# Patient Record
Sex: FEMALE | Race: WHITE | HISPANIC OR LATINO | Employment: FULL TIME | URBAN - METROPOLITAN AREA
[De-identification: names, ages, dates, MRNs, and addresses within clinical notes are randomized per-mention and may not be internally consistent; named-entity substitution may affect disease eponyms.]

---

## 2024-09-18 ENCOUNTER — OFFICE VISIT (OUTPATIENT)
Dept: PSYCHIATRY | Facility: CLINIC | Age: 59
End: 2024-09-18
Payer: COMMERCIAL

## 2024-09-18 VITALS
SYSTOLIC BLOOD PRESSURE: 110 MMHG | DIASTOLIC BLOOD PRESSURE: 75 MMHG | HEART RATE: 70 BPM | WEIGHT: 212.31 LBS | BODY MASS INDEX: 31.44 KG/M2 | HEIGHT: 69 IN

## 2024-09-18 DIAGNOSIS — R41.840 CONCENTRATION DEFICIT: ICD-10-CM

## 2024-09-18 DIAGNOSIS — F90.0 ADHD (ATTENTION DEFICIT HYPERACTIVITY DISORDER), INATTENTIVE TYPE: ICD-10-CM

## 2024-09-18 DIAGNOSIS — F33.0 MILD EPISODE OF RECURRENT MAJOR DEPRESSIVE DISORDER: Primary | ICD-10-CM

## 2024-09-18 PROCEDURE — 1159F MED LIST DOCD IN RCRD: CPT | Mod: CPTII,S$GLB,, | Performed by: PSYCHOLOGIST

## 2024-09-18 PROCEDURE — 90792 PSYCH DIAG EVAL W/MED SRVCS: CPT | Mod: S$GLB,,, | Performed by: PSYCHOLOGIST

## 2024-09-18 PROCEDURE — 3074F SYST BP LT 130 MM HG: CPT | Mod: CPTII,S$GLB,, | Performed by: PSYCHOLOGIST

## 2024-09-18 PROCEDURE — 99999 PR PBB SHADOW E&M-EST. PATIENT-LVL III: CPT | Mod: PBBFAC,,, | Performed by: PSYCHOLOGIST

## 2024-09-18 PROCEDURE — 3078F DIAST BP <80 MM HG: CPT | Mod: CPTII,S$GLB,, | Performed by: PSYCHOLOGIST

## 2024-09-18 RX ORDER — DEXTROAMPHETAMINE SACCHARATE, AMPHETAMINE ASPARTATE, DEXTROAMPHETAMINE SULFATE, AND AMPHETAMINE SULFATE 2.5; 2.5; 2.5; 2.5 MG/1; MG/1; MG/1; MG/1
1 TABLET ORAL DAILY
Qty: 30 TABLET | Refills: 0 | Status: SHIPPED | OUTPATIENT
Start: 2024-09-18

## 2024-09-18 RX ORDER — ESCITALOPRAM OXALATE 10 MG/1
10 TABLET ORAL DAILY
Qty: 30 TABLET | Refills: 1 | Status: SHIPPED | OUTPATIENT
Start: 2024-09-18 | End: 2025-09-18
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
## 2024-09-18 NOTE — LETTER
September 18, 2024        Tamiko Finley MD  [id number] [facility] [id number]             [place] - Psychiatry  [address]  [facility] [address]  Phone: [phone]   Patient: Elvira Lipscomb   MR Number: [id number]   YOB: 1965   Date of Visit: 9/18/2024       Dear Dr. Finley:    Thank you for referring Elvira Lipscomb to me for evaluation. Below are the relevant portions of my assessment and plan of care.    Pt is to stop Prozac and start Lexapro 10mg once daily. Pt will continue Adderall 10mg Q AM and Xanax 0.25mg Q D PRN.    If you have questions, please do not hesitate to call me. I look forward to following Elvira along with you.    Sincerely,      Teo Infante, PhD, MP           CC    No Recipients

## 2024-09-19 ENCOUNTER — TELEPHONE (OUTPATIENT)
Dept: PSYCHIATRY | Facility: CLINIC | Age: 59
End: 2024-09-19
Payer: COMMERCIAL

## 2024-09-19 ENCOUNTER — OFFICE VISIT (OUTPATIENT)
Dept: OTOLARYNGOLOGY | Facility: CLINIC | Age: 59
End: 2024-09-19
Payer: COMMERCIAL

## 2024-09-19 ENCOUNTER — TELEPHONE (OUTPATIENT)
Dept: OTOLARYNGOLOGY | Facility: CLINIC | Age: 59
End: 2024-09-19
Payer: COMMERCIAL

## 2024-09-19 VITALS — HEIGHT: 69 IN | BODY MASS INDEX: 30.98 KG/M2 | WEIGHT: 209.19 LBS

## 2024-09-19 DIAGNOSIS — S02.2XXA CLOSED FRACTURE OF NASAL BONE, INITIAL ENCOUNTER: Primary | ICD-10-CM

## 2024-09-19 DIAGNOSIS — S02.2XXA CLOSED FRACTURE OF NASAL SEPTUM, INITIAL ENCOUNTER: ICD-10-CM

## 2024-09-19 PROCEDURE — 3008F BODY MASS INDEX DOCD: CPT | Mod: CPTII,S$GLB,, | Performed by: STUDENT IN AN ORGANIZED HEALTH CARE EDUCATION/TRAINING PROGRAM

## 2024-09-19 PROCEDURE — 1159F MED LIST DOCD IN RCRD: CPT | Mod: CPTII,S$GLB,, | Performed by: STUDENT IN AN ORGANIZED HEALTH CARE EDUCATION/TRAINING PROGRAM

## 2024-09-19 PROCEDURE — 99204 OFFICE O/P NEW MOD 45 MIN: CPT | Mod: S$GLB,,, | Performed by: STUDENT IN AN ORGANIZED HEALTH CARE EDUCATION/TRAINING PROGRAM

## 2024-09-19 PROCEDURE — 99999 PR PBB SHADOW E&M-EST. PATIENT-LVL III: CPT | Mod: PBBFAC,,, | Performed by: STUDENT IN AN ORGANIZED HEALTH CARE EDUCATION/TRAINING PROGRAM

## 2024-09-19 RX ORDER — CYCLOBENZAPRINE HCL 10 MG
10 TABLET ORAL 3 TIMES DAILY PRN
Qty: 30 TABLET | Refills: 0 | Status: SHIPPED | OUTPATIENT
Start: 2024-09-19 | End: 2024-09-29
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
## 2024-09-19 NOTE — TELEPHONE ENCOUNTER
----- Message from Carolina De Leon sent at 9/19/2024  8:27 AM CDT -----  Contact: Cesar/ Spouse  Type:  Sooner Apoointment Request    Caller is requesting a sooner appointment.  Caller declined first available appointment listed below.  Caller will not accept being placed on the waitlist and is requesting a message be sent to doctor.  Name of Caller:Cesar/ Spouse  When is the first available appointment?Nov 1  Symptoms:broken Nose  Would the patient rather a call back or a response via [employer]? Call back  Best Call Back Number:  Additional Information: Cesar is calling to speak to the nurse regarding a sooner appointment     Thanks  RAHEEM

## 2024-09-19 NOTE — TELEPHONE ENCOUNTER
S/w  and he states that pt fell and broke her nose yesterday.  requesting an appt with Dr. Black to discuss surgery. Appt made for today and informed  to bring disc of CT imaging, he verbalized understanding.
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
## 2024-09-20 ENCOUNTER — ANESTHESIA EVENT (OUTPATIENT)
Dept: SURGERY | Facility: HOSPITAL | Age: 59
End: 2024-09-20
Payer: COMMERCIAL

## 2024-09-23 ENCOUNTER — ANESTHESIA (OUTPATIENT)
Dept: SURGERY | Facility: HOSPITAL | Age: 59
End: 2024-09-23
Payer: COMMERCIAL

## 2024-09-23 ENCOUNTER — HOSPITAL ENCOUNTER (OUTPATIENT)
Facility: HOSPITAL | Age: 59
Discharge: HOME | End: 2024-09-23
Attending: STUDENT IN AN ORGANIZED HEALTH CARE EDUCATION/TRAINING PROGRAM | Admitting: STUDENT IN AN ORGANIZED HEALTH CARE EDUCATION/TRAINING PROGRAM
Payer: COMMERCIAL

## 2024-09-23 VITALS
HEART RATE: 61 BPM | HEIGHT: 69 IN | WEIGHT: 209 LBS | OXYGEN SATURATION: 99 % | RESPIRATION RATE: 18 BRPM | BODY MASS INDEX: 30.96 KG/M2 | TEMPERATURE: 98 F | DIASTOLIC BLOOD PRESSURE: 62 MMHG | SYSTOLIC BLOOD PRESSURE: 114 MMHG

## 2024-09-23 DIAGNOSIS — S02.2XXA NASAL BONE FRACTURE: ICD-10-CM

## 2024-09-23 DIAGNOSIS — J34.89 ACQUIRED NASAL SEPTAL DEFECT: ICD-10-CM

## 2024-09-23 DIAGNOSIS — S02.2XXA CLOSED FRACTURE OF NASAL BONE, INITIAL ENCOUNTER: Primary | ICD-10-CM

## 2024-09-23 PROCEDURE — 71000015 HC POSTOP RECOV 1ST HR: Mod: PO | Performed by: STUDENT IN AN ORGANIZED HEALTH CARE EDUCATION/TRAINING PROGRAM

## 2024-09-23 PROCEDURE — 21337 CLOSED TX SEPTAL&NOSE FX: CPT | Mod: ,,, | Performed by: STUDENT IN AN ORGANIZED HEALTH CARE EDUCATION/TRAINING PROGRAM

## 2024-09-23 PROCEDURE — 71000033 HC RECOVERY, INTIAL HOUR: Mod: PO | Performed by: STUDENT IN AN ORGANIZED HEALTH CARE EDUCATION/TRAINING PROGRAM

## 2024-09-23 PROCEDURE — 37000009 HC ANESTHESIA EA ADD 15 MINS: Mod: PO | Performed by: STUDENT IN AN ORGANIZED HEALTH CARE EDUCATION/TRAINING PROGRAM

## 2024-09-23 PROCEDURE — 25000003 PHARM REV CODE 250: Mod: PO | Performed by: NURSE ANESTHETIST, CERTIFIED REGISTERED

## 2024-09-23 PROCEDURE — 37000008 HC ANESTHESIA 1ST 15 MINUTES: Mod: PO | Performed by: STUDENT IN AN ORGANIZED HEALTH CARE EDUCATION/TRAINING PROGRAM

## 2024-09-23 PROCEDURE — 25000003 PHARM REV CODE 250: Mod: PO | Performed by: STUDENT IN AN ORGANIZED HEALTH CARE EDUCATION/TRAINING PROGRAM

## 2024-09-23 PROCEDURE — 36000704 HC OR TIME LEV I 1ST 15 MIN: Mod: PO | Performed by: STUDENT IN AN ORGANIZED HEALTH CARE EDUCATION/TRAINING PROGRAM

## 2024-09-23 PROCEDURE — 63600175 PHARM REV CODE 636 W HCPCS: Mod: PO | Performed by: ANESTHESIOLOGY

## 2024-09-23 PROCEDURE — 63600175 PHARM REV CODE 636 W HCPCS: Mod: PO | Performed by: NURSE ANESTHETIST, CERTIFIED REGISTERED

## 2024-09-23 PROCEDURE — 36000705 HC OR TIME LEV I EA ADD 15 MIN: Mod: PO | Performed by: STUDENT IN AN ORGANIZED HEALTH CARE EDUCATION/TRAINING PROGRAM

## 2024-09-23 PROCEDURE — 21320 CLSD TX NSL FX W/MNPJ&STABLJ: CPT | Mod: 59,,, | Performed by: STUDENT IN AN ORGANIZED HEALTH CARE EDUCATION/TRAINING PROGRAM

## 2024-09-23 PROCEDURE — 71000039 HC RECOVERY, EACH ADD'L HOUR: Mod: PO | Performed by: STUDENT IN AN ORGANIZED HEALTH CARE EDUCATION/TRAINING PROGRAM

## 2024-09-23 RX ORDER — SODIUM CHLORIDE, SODIUM LACTATE, POTASSIUM CHLORIDE, CALCIUM CHLORIDE 600; 310; 30; 20 MG/100ML; MG/100ML; MG/100ML; MG/100ML
INJECTION, SOLUTION INTRAVENOUS CONTINUOUS
Status: DISCONTINUED | OUTPATIENT
Start: 2024-09-23 | End: 2024-09-23 | Stop reason: HOSPADM

## 2024-09-23 RX ORDER — LIDOCAINE HYDROCHLORIDE 10 MG/ML
1 INJECTION, SOLUTION EPIDURAL; INFILTRATION; INTRACAUDAL; PERINEURAL ONCE
Status: DISCONTINUED | OUTPATIENT
Start: 2024-09-23 | End: 2024-09-23 | Stop reason: HOSPADM

## 2024-09-23 RX ORDER — PROPOFOL 10 MG/ML
VIAL (ML) INTRAVENOUS
Status: DISCONTINUED | OUTPATIENT
Start: 2024-09-23 | End: 2024-09-23

## 2024-09-23 RX ORDER — SCOLOPAMINE TRANSDERMAL SYSTEM 1 MG/1
1 PATCH, EXTENDED RELEASE TRANSDERMAL ONCE
Status: DISCONTINUED | OUTPATIENT
Start: 2024-09-23 | End: 2024-09-23 | Stop reason: HOSPADM

## 2024-09-23 RX ORDER — FENTANYL CITRATE 50 UG/ML
INJECTION, SOLUTION INTRAMUSCULAR; INTRAVENOUS
Status: DISCONTINUED | OUTPATIENT
Start: 2024-09-23 | End: 2024-09-23

## 2024-09-23 RX ORDER — METOCLOPRAMIDE HYDROCHLORIDE 5 MG/ML
10 INJECTION INTRAMUSCULAR; INTRAVENOUS EVERY 10 MIN PRN
Status: DISCONTINUED | OUTPATIENT
Start: 2024-09-23 | End: 2024-09-23 | Stop reason: HOSPADM

## 2024-09-23 RX ORDER — OXYCODONE HYDROCHLORIDE 5 MG/1
5 TABLET ORAL ONCE AS NEEDED
Status: DISCONTINUED | OUTPATIENT
Start: 2024-09-23 | End: 2024-09-23 | Stop reason: HOSPADM

## 2024-09-23 RX ORDER — ROCURONIUM BROMIDE 10 MG/ML
INJECTION, SOLUTION INTRAVENOUS
Status: DISCONTINUED | OUTPATIENT
Start: 2024-09-23 | End: 2024-09-23

## 2024-09-23 RX ORDER — DEXAMETHASONE SODIUM PHOSPHATE 4 MG/ML
INJECTION, SOLUTION INTRA-ARTICULAR; INTRALESIONAL; INTRAMUSCULAR; INTRAVENOUS; SOFT TISSUE
Status: DISCONTINUED | OUTPATIENT
Start: 2024-09-23 | End: 2024-09-23

## 2024-09-23 RX ORDER — ONDANSETRON HYDROCHLORIDE 2 MG/ML
INJECTION, SOLUTION INTRAVENOUS
Status: DISCONTINUED | OUTPATIENT
Start: 2024-09-23 | End: 2024-09-23

## 2024-09-23 RX ORDER — HYDROCODONE BITARTRATE AND ACETAMINOPHEN 5; 325 MG/1; MG/1
1 TABLET ORAL EVERY 8 HOURS PRN
Qty: 9 TABLET | Refills: 0 | Status: SHIPPED | OUTPATIENT
Start: 2024-09-23 | End: 2024-09-26

## 2024-09-23 RX ORDER — MUPIROCIN 20 MG/G
OINTMENT TOPICAL
Status: DISCONTINUED | OUTPATIENT
Start: 2024-09-23 | End: 2024-09-23 | Stop reason: HOSPADM

## 2024-09-23 RX ORDER — OXYMETAZOLINE HCL 0.05 %
SPRAY, NON-AEROSOL (ML) NASAL
Status: DISCONTINUED | OUTPATIENT
Start: 2024-09-23 | End: 2024-09-23 | Stop reason: HOSPADM

## 2024-09-23 RX ORDER — SODIUM CHLORIDE 0.9 % (FLUSH) 0.9 %
3 SYRINGE (ML) INJECTION
Status: DISCONTINUED | OUTPATIENT
Start: 2024-09-23 | End: 2024-09-23 | Stop reason: HOSPADM

## 2024-09-23 RX ORDER — FENTANYL CITRATE 50 UG/ML
25 INJECTION, SOLUTION INTRAMUSCULAR; INTRAVENOUS EVERY 5 MIN PRN
Status: DISCONTINUED | OUTPATIENT
Start: 2024-09-23 | End: 2024-09-23 | Stop reason: HOSPADM

## 2024-09-23 RX ORDER — MIDAZOLAM HYDROCHLORIDE 1 MG/ML
INJECTION INTRAMUSCULAR; INTRAVENOUS
Status: DISCONTINUED | OUTPATIENT
Start: 2024-09-23 | End: 2024-09-23

## 2024-09-23 RX ORDER — LIDOCAINE HYDROCHLORIDE 20 MG/ML
INJECTION INTRAVENOUS
Status: DISCONTINUED | OUTPATIENT
Start: 2024-09-23 | End: 2024-09-23

## 2024-09-23 RX ORDER — SUCCINYLCHOLINE CHLORIDE 20 MG/ML
INJECTION INTRAMUSCULAR; INTRAVENOUS
Status: DISCONTINUED | OUTPATIENT
Start: 2024-09-23 | End: 2024-09-23

## 2024-09-23 RX ORDER — KETOROLAC TROMETHAMINE 30 MG/ML
INJECTION, SOLUTION INTRAMUSCULAR; INTRAVENOUS
Status: DISCONTINUED | OUTPATIENT
Start: 2024-09-23 | End: 2024-09-23

## 2024-09-23 RX ADMIN — MIDAZOLAM HYDROCHLORIDE 2 MG: 2 INJECTION, SOLUTION INTRAMUSCULAR; INTRAVENOUS at 08:09

## 2024-09-23 RX ADMIN — ROCURONIUM BROMIDE 5 MG: 10 INJECTION, SOLUTION INTRAVENOUS at 08:09

## 2024-09-23 RX ADMIN — KETOROLAC TROMETHAMINE 30 MG: 30 INJECTION, SOLUTION INTRAMUSCULAR at 08:09

## 2024-09-23 RX ADMIN — SUCCINYLCHOLINE CHLORIDE 140 MG: 20 INJECTION, SOLUTION INTRAMUSCULAR; INTRAVENOUS at 08:09

## 2024-09-23 RX ADMIN — SODIUM CHLORIDE, POTASSIUM CHLORIDE, SODIUM LACTATE AND CALCIUM CHLORIDE: 600; 310; 30; 20 INJECTION, SOLUTION INTRAVENOUS at 08:09

## 2024-09-23 RX ADMIN — LIDOCAINE HYDROCHLORIDE 75 MG: 20 INJECTION INTRAVENOUS at 08:09

## 2024-09-23 RX ADMIN — ONDANSETRON 4 MG: 2 INJECTION, SOLUTION INTRAMUSCULAR; INTRAVENOUS at 08:09

## 2024-09-23 RX ADMIN — FENTANYL CITRATE 50 MCG: 50 INJECTION, SOLUTION INTRAMUSCULAR; INTRAVENOUS at 09:09

## 2024-09-23 RX ADMIN — FENTANYL CITRATE 50 MCG: 50 INJECTION, SOLUTION INTRAMUSCULAR; INTRAVENOUS at 08:09

## 2024-09-23 RX ADMIN — DEXAMETHASONE SODIUM PHOSPHATE 8 MG: 4 INJECTION, SOLUTION INTRAMUSCULAR; INTRAVENOUS at 08:09

## 2024-09-23 RX ADMIN — PROPOFOL 170 MG: 10 INJECTION, EMULSION INTRAVENOUS at 08:09

## 2024-09-23 NOTE — DISCHARGE INSTRUCTIONS
Nasal Bone Reduction   Post Operative Instructions  Elvira Black MD  Otolaryngology - [facility] - [phone]      Slight trickling of blood in the back of your throat, in front of your nose is to be expected. You may use nasal saline to clear your nose while the splint is in place to avoid manipulating the external nose. You may also use over the counter afrin nasal decongestant to shrink the blood vessels and reduce bleeding if bothersome.     Nasal splint will stay on for 7 days. Avoid getting the splint wet. You may shower, but keep this area dry or splint may come off early. If splint comes off earlier than 4 days, attempt to tape back over nose. You can call with concerns if this happens.     Internal splints will be removed Friday. There is 1 stitch in the front holding this in. This is not a particularly painful visit, more of a strange sensation coming out. Please spray saline spray around these three times a day until they are removed. Please apply mupirocin to both nostrils twice a day until splints removed. I recommend spraying saline for a week or so after as well to keep the nose clear while I want you avoiding nose blowing.     Diet: You may experience nausea after surgery. Avoid heavy meals on that day. Wait 24 hours to gradually resume your normal diet. Drink plenty of fluids to maintain hydration (6-8 glasses daily minimum). Avoid alcohol or caffeine the first week after surgery.    Restricted Activities: Do not plan to blow your nose, use CPAP, bend over or lift heavy objects (over 10 lbs) for the first week after surgery. This may result in a nose bleed. Rest and do not exert or overheat yourself with exercise or other physical activity.     Post-Operative pain: pain medication (Percocet or Norco) may be ordered for significant post-operative pain, (FOR HEADACHES). You can still take extra-strength acetaminophen if the pain is not too uncomfortable, to avoid the occasional  side-affects of nausea, constipation, or grogginess, associated with all narcotics. You may also alternate with ibuprofen. Use stool softener such as: dulcolax or Miralax to prevent constipation. Do not drive or handle heavy machinery while on narcotics. Throat discomfort is not too uncommon after endotracheal intubation during general anesthesia. This should resolve on it's own within 5 days. Throat lozenges or gargles tend to soothe the discomfort.     Signs and symptoms that MUST be reported to the physician/nurse immediately by calling  or clinic.  *Fever over 102 degree F. *Persistent bleeding with more that ¼ cup of bright red blood within a short period of time (half hour). *Severe pain unrelieved by prescribed medication. *Mental or visual changes (confusion, slurred speech, double- vision, visual loss).    * If you experience difficulty breathing, shortness of breath, or severe bleeding, call 911 or go to the nearest emergency room.

## 2024-09-23 NOTE — ANESTHESIA PREPROCEDURE EVALUATION
09/23/2024  Elvira Lipscomb is a 59 y.o., female.      Pre-op Assessment    I have reviewed the Patient Summary Reports.     I have reviewed the Nursing Notes. I have reviewed the NPO Status.   I have reviewed the Medications.     Review of Systems  Anesthesia Hx:  No problems with previous Anesthesia                Cardiovascular:  Cardiovascular Normal                                            Pulmonary:  Pulmonary Normal                       Neurological:  Neurology Normal                                      Endocrine:   Hypothyroidism       Hypothyroidism          Psych:  Psychiatric History  depression ADHD               Physical Exam  General: Well nourished    Airway:  Mallampati: II   Mouth Opening: Normal  TM Distance: Normal  Neck ROM: Normal ROM        Anesthesia Plan  Type of Anesthesia, risks & benefits discussed:    Anesthesia Type: Gen ETT  Intra-op Monitoring Plan: Standard ASA Monitors  Post Op Pain Control Plan: multimodal analgesia and IV/PO Opioids PRN  Induction:  IV  Airway Plan: Video  Informed Consent: Informed consent signed with the Patient and all parties understand the risks and agree with anesthesia plan.  All questions answered.   ASA Score: 2    Ready For Surgery From Anesthesia Perspective.     .

## 2024-09-23 NOTE — OP NOTE
Otolaryngology- Head & Neck Surgery  Operative Report    Elvira Lipscomb  [id number]  1965    Date of surgery: 9/23/2024    Preoperative Diagnosis:   Displaced nasal bone fracture  Septal fracture and deviation    Postoperative Diagnosis:    Same    Procedure:   1. Closed nasal bone reduction with external stabilization  2. Closed septal reduction with internal stabilization    Attending:  Elvira Black MD    Assist: none    Anesthesia: General     EBL: < 5 ml    Complications: None    Findings: septum deviated right, very slight nasal bone deviation left.     Disposition: Stable, to PACU    Preoperative Indication:   Elvira Lipscomb is a 59 y.o. female who has been noted to have nasal septal fracture after trauma with nasal obstruction on the right. She has elected to proceed with reduction in OR.       Description of Procedure:  Patient was brought to the operating room and placed on the table in supine position.  Anesthesia was obtained via endotracheal tube.  The eyes were taped shut and a timeout was performed.     Afrin pledgets were used for hemostasis and to decongest the nose. Hanna x 2 were used to medialize septum. She had small spur anterior on floor on right and near middle meatus on left. Left nasal bones were medialized with external force with improvement of deviation. Septal bowing was better after as well.   Mastisol and steri strips were placed over the nose followed by a thermoplastic splint. Oropharynx was suctioned.     At the end of the procedure, the patient was awakened from anesthesia and transferred to the PACU in good condition.      Elvira Black MD  Otolaryngology Attending

## 2024-09-23 NOTE — ANESTHESIA PROCEDURE NOTES
Intubation    Date/Time: 9/23/2024 8:40 AM    Performed by: Garrett Rodriguez CRNA  Authorized by: Alberto Ortiz MD    Intubation:     Induction:  Rapid sequence induction    Intubated:  Postinduction    Mask Ventilation:  Easy mask    Attempts:  1    Attempted By:  CRNA    Method of Intubation:  Video laryngoscopy    Blade:  Feliz 3    Laryngeal View Grade: Grade I - full view of cords      Difficult Airway Encountered?: No      Complications:  None    Airway Device:  Oral endotracheal tube    Airway Device Size:  7.0    Style/Cuff Inflation:  Cuffed    Inflation Amount (mL):  5    Tube secured:  22    Secured at:  The lips    Placement Verified By:  Capnometry    Complicating Factors:  None    Findings Post-Intubation:  BS equal bilateral and atraumatic/condition of teeth unchanged

## 2024-09-23 NOTE — ANESTHESIA POSTPROCEDURE EVALUATION
Anesthesia Post Evaluation    Patient: Elvira Lipscomb    Procedure(s) Performed: Procedure(s) (LRB):  CLOSED REDUCTION, FRACTURE, NASAL BONE, septal reduction with splints (Bilateral)    Final Anesthesia Type: general      Patient location during evaluation: PACU  Patient participation: Yes- Able to Participate  Level of consciousness: awake  Post-procedure vital signs: reviewed and stable  Pain management: adequate  Airway patency: patent    PONV status at discharge: No PONV  Anesthetic complications: no      Cardiovascular status: blood pressure returned to baseline  Respiratory status: unassisted  Hydration status: euvolemic  Follow-up not needed.              Vitals Value Taken Time   /62 09/23/24 0930   Temp 36.4 °C (97.6 °F) 09/23/24 0902   Pulse 61 09/23/24 0930   Resp 18 09/23/24 0930   SpO2 99 % 09/23/24 0930         Event Time   Out of Recovery 09:17:00         Pain/Julius Score: Julius Score: 10 (9/23/2024  9:20 AM)

## 2024-09-23 NOTE — DISCHARGE SUMMARY
[facility] - [facility]  Discharge Note  Short Stay    Procedure(s) (LRB):  CLOSED REDUCTION, FRACTURE, NASAL BONE, septal reduction with splints (Bilateral)      OUTCOME: Patient tolerated treatment/procedure well without complication and is now ready for discharge.    DISPOSITION: Home or Self Care    FINAL DIAGNOSIS:  <principal problem not specified>    FOLLOWUP: In clinic    DISCHARGE INSTRUCTIONS:  No discharge procedures on file.     TIME SPENT ON DISCHARGE: 10 minutes

## 2024-09-23 NOTE — TRANSFER OF CARE
"Anesthesia Transfer of Care Note    Patient: Elvira Lipscomb    Procedure(s) Performed: Procedure(s) (LRB):  CLOSED REDUCTION, FRACTURE, NASAL BONE, septal reduction with splints (Bilateral)    Patient location: PACU    Anesthesia Type: general    Transport from OR: Transported from OR on room air with adequate spontaneous ventilation    Post pain: adequate analgesia    Post assessment: no apparent anesthetic complications and tolerated procedure well    Post vital signs: stable    Level of consciousness: awake and alert    Nausea/Vomiting: no nausea/vomiting    Complications: none    Transfer of care protocol was followed      Last vitals: Visit Vitals  /61   Pulse (!) 59   Temp 36.5 °C (97.7 °F) (Skin)   Resp 18   Ht 5' 9" (1.753 m)   Wt 94.8 kg (209 lb)   SpO2 96%   Breastfeeding No   BMI 30.86 kg/m²     "

## 2024-09-27 ENCOUNTER — OFFICE VISIT (OUTPATIENT)
Dept: OTOLARYNGOLOGY | Facility: CLINIC | Age: 59
End: 2024-09-27
Payer: COMMERCIAL

## 2024-09-27 VITALS — BODY MASS INDEX: 30.85 KG/M2 | WEIGHT: 208.31 LBS | HEIGHT: 69 IN

## 2024-09-27 DIAGNOSIS — S02.2XXS CLOSED FRACTURE OF NASAL SEPTUM, SEQUELA: Primary | ICD-10-CM

## 2024-09-27 PROCEDURE — 99999 PR PBB SHADOW E&M-EST. PATIENT-LVL III: CPT | Mod: PBBFAC,,, | Performed by: NURSE PRACTITIONER

## 2024-09-27 NOTE — PROGRESS NOTES
She is now S/P surgery and doing well. She last took pain medication last night. She has not been using any saline rinse sine surgery.     Procedure: 9/23/24  1. Closed nasal bone reduction with external stabilization  2. Closed septal reduction with internal stabilization    Anesthesia: lidocaine and phenylephrine nasal spray  Splints removed today in office. Patient tolerated procedure well.     P:  She is scheduled for her post-op with Dr. Black next Friday   Start Saline rinse at least 2x a day until she sees Dr. Black

## 2024-10-03 ENCOUNTER — OCCUPATIONAL HEALTH (OUTPATIENT)
Dept: URGENT CARE | Facility: CLINIC | Age: 59
End: 2024-10-03
Payer: COMMERCIAL

## 2024-10-04 ENCOUNTER — OFFICE VISIT (OUTPATIENT)
Dept: OTOLARYNGOLOGY | Facility: CLINIC | Age: 59
End: 2024-10-04
Payer: COMMERCIAL

## 2024-10-04 DIAGNOSIS — S02.2XXS CLOSED FRACTURE OF NASAL SEPTUM, SEQUELA: Primary | ICD-10-CM

## 2024-10-04 PROCEDURE — 99999 PR PBB SHADOW E&M-EST. PATIENT-LVL I: CPT | Mod: PBBFAC,,, | Performed by: STUDENT IN AN ORGANIZED HEALTH CARE EDUCATION/TRAINING PROGRAM

## 2024-10-04 NOTE — PROGRESS NOTES
She is now S/P surgery and doing well. Splints removed by margaret last week.     Procedure: 9/23/24  1. Closed nasal bone reduction with external stabilization  2. Closed septal reduction with internal stabilization    Nasal exam  Midline possible slight deviation left at dorsum, septum midline with good airflow    A/P:   Doing well post op. Back at work. RTC PRN

## 2024-10-08 ENCOUNTER — OFFICE VISIT (OUTPATIENT)
Dept: PSYCHIATRY | Facility: CLINIC | Age: 59
End: 2024-10-08
Payer: COMMERCIAL

## 2024-10-08 VITALS
HEIGHT: 69 IN | WEIGHT: 208 LBS | BODY MASS INDEX: 30.81 KG/M2 | SYSTOLIC BLOOD PRESSURE: 121 MMHG | HEART RATE: 61 BPM | DIASTOLIC BLOOD PRESSURE: 81 MMHG

## 2024-10-08 DIAGNOSIS — F33.0 MILD EPISODE OF RECURRENT MAJOR DEPRESSIVE DISORDER: ICD-10-CM

## 2024-10-08 DIAGNOSIS — F90.0 ADHD (ATTENTION DEFICIT HYPERACTIVITY DISORDER), INATTENTIVE TYPE: Primary | ICD-10-CM

## 2024-10-08 PROCEDURE — 99214 OFFICE O/P EST MOD 30 MIN: CPT | Mod: S$GLB,,, | Performed by: PSYCHOLOGIST

## 2024-10-08 PROCEDURE — 3008F BODY MASS INDEX DOCD: CPT | Mod: CPTII,S$GLB,, | Performed by: PSYCHOLOGIST

## 2024-10-08 PROCEDURE — 1159F MED LIST DOCD IN RCRD: CPT | Mod: CPTII,S$GLB,, | Performed by: PSYCHOLOGIST

## 2024-10-08 PROCEDURE — 99999 PR PBB SHADOW E&M-EST. PATIENT-LVL III: CPT | Mod: PBBFAC,,, | Performed by: PSYCHOLOGIST

## 2024-10-08 PROCEDURE — 90833 PSYTX W PT W E/M 30 MIN: CPT | Mod: S$GLB,,, | Performed by: PSYCHOLOGIST

## 2024-10-08 PROCEDURE — 3074F SYST BP LT 130 MM HG: CPT | Mod: CPTII,S$GLB,, | Performed by: PSYCHOLOGIST

## 2024-10-08 PROCEDURE — 3079F DIAST BP 80-89 MM HG: CPT | Mod: CPTII,S$GLB,, | Performed by: PSYCHOLOGIST

## 2024-10-08 RX ORDER — DESVENLAFAXINE 50 MG/1
50 TABLET, FILM COATED, EXTENDED RELEASE ORAL DAILY
Qty: 30 TABLET | Refills: 1 | Status: SHIPPED | OUTPATIENT
Start: 2024-10-08 | End: 2025-10-08
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
## 2024-11-02 DIAGNOSIS — F90.0 ADHD (ATTENTION DEFICIT HYPERACTIVITY DISORDER), INATTENTIVE TYPE: Primary | ICD-10-CM

## 2024-11-04 RX ORDER — DEXTROAMPHETAMINE SACCHARATE, AMPHETAMINE ASPARTATE, DEXTROAMPHETAMINE SULFATE, AND AMPHETAMINE SULFATE 2.5; 2.5; 2.5; 2.5 MG/1; MG/1; MG/1; MG/1
1 TABLET ORAL DAILY
Qty: 30 TABLET | Refills: 0 | Status: SHIPPED | OUTPATIENT
Start: 2024-11-04 | End: 2024-11-05

## 2024-11-05 ENCOUNTER — OFFICE VISIT (OUTPATIENT)
Dept: PSYCHIATRY | Facility: CLINIC | Age: 59
End: 2024-11-05
Payer: COMMERCIAL

## 2024-11-05 VITALS
DIASTOLIC BLOOD PRESSURE: 88 MMHG | HEIGHT: 69 IN | SYSTOLIC BLOOD PRESSURE: 122 MMHG | HEART RATE: 62 BPM | WEIGHT: 205.38 LBS | BODY MASS INDEX: 30.42 KG/M2

## 2024-11-05 DIAGNOSIS — F33.0 MILD EPISODE OF RECURRENT MAJOR DEPRESSIVE DISORDER: ICD-10-CM

## 2024-11-05 DIAGNOSIS — F90.0 ADHD (ATTENTION DEFICIT HYPERACTIVITY DISORDER), INATTENTIVE TYPE: Primary | ICD-10-CM

## 2024-11-05 PROCEDURE — 99214 OFFICE O/P EST MOD 30 MIN: CPT | Mod: S$GLB,,, | Performed by: PSYCHOLOGIST

## 2024-11-05 PROCEDURE — 90833 PSYTX W PT W E/M 30 MIN: CPT | Mod: S$GLB,,, | Performed by: PSYCHOLOGIST

## 2024-11-05 PROCEDURE — 99999 PR PBB SHADOW E&M-EST. PATIENT-LVL III: CPT | Mod: PBBFAC,,, | Performed by: PSYCHOLOGIST

## 2024-11-05 PROCEDURE — 3079F DIAST BP 80-89 MM HG: CPT | Mod: CPTII,S$GLB,, | Performed by: PSYCHOLOGIST

## 2024-11-05 PROCEDURE — 3074F SYST BP LT 130 MM HG: CPT | Mod: CPTII,S$GLB,, | Performed by: PSYCHOLOGIST

## 2024-11-05 PROCEDURE — 1159F MED LIST DOCD IN RCRD: CPT | Mod: CPTII,S$GLB,, | Performed by: PSYCHOLOGIST

## 2024-11-05 PROCEDURE — 3008F BODY MASS INDEX DOCD: CPT | Mod: CPTII,S$GLB,, | Performed by: PSYCHOLOGIST

## 2024-11-05 RX ORDER — VORTIOXETINE 10 MG/1
10 TABLET, FILM COATED ORAL DAILY
Qty: 30 TABLET | Refills: 1 | Status: SHIPPED | OUTPATIENT
Start: 2024-11-05

## 2024-11-05 RX ORDER — DEXTROAMPHETAMINE SACCHARATE, AMPHETAMINE ASPARTATE, DEXTROAMPHETAMINE SULFATE AND AMPHETAMINE SULFATE 2.5; 2.5; 2.5; 2.5 MG/1; MG/1; MG/1; MG/1
10 TABLET ORAL DAILY
Qty: 30 TABLET | Refills: 0 | Status: SHIPPED | OUTPATIENT
Start: 2024-11-05

## 2024-11-05 NOTE — PROGRESS NOTES
Outpatient Psychiatry Follow-Up Visit    Clinical Status of Patient: Outpatient (Ambulatory)  11/05/2024     Chief Complaint: 59 year old female presenting today for a follow-up.       Interval History and Content of Current Session:  Interim Events/Subjective Report/Content of Current Session:  follow-up appointment.    Pt is a 59 year old female with past psychiatric hx of ADHD and depression who presents for follow-up treatment. Pt feels more anxious and is crying more on Pristiq. Will try Trintellix. Pt discussed more difficulty at work. Pt's daughter is doing somewhat better. Pt denied any other major changes or new stressors.    Past Psychiatric hx: Vyvanse, Prozac (sexual SE); Lexapro (sexual SE); Pristiq 50mg Q D (sexual SE)    Past Medical hx:   Past Medical History:   Diagnosis Date    Adult hypothyroidism 04/05/2007    Anxiety and depression         Interim hx:  Medication changes last visit:  stopped Lexapro and started Pristiq 50mg Q D  Anxiety: moderate - increased  Depression: moderate - increased     Denies suicidal/homicidal ideations.  Denies hopelessness/worthlessness.    Denies auditory/visual hallucinations      Alcohol: minimal, infrequent  Drug: pt denied  Caffeine: minimal use  Tobacco: pt denied      Review of Systems   PSYCHIATRIC: Pertinent items are noted in the narrative.        CONSTITUTIONAL: weight stable    Past Medical, Family and Social History: The patient's past medical, family and social history have been reviewed and updated as appropriate within the electronic medical record. See encounter notes.     Current Psychiatric Medication:  Pristiq 50mg once daily, Adderall 10mg Q AM and Xanax 0.25mg Q D PRN      Compliance: yes      Side effects: sexual side effects     Risk Parameters:  Patient reports no suicidal ideation  Patient reports no homicidal ideation  Patient reports no self-injurious behavior  Patient reports no violent behavior     Exam (detailed: at least 9 elements;  comprehensive: all 15 elements)   Constitutional  Vitals:  Most recent vital signs, dated less than 90 days prior to this appointment, were reviewed. Pulse:  [62]   BP: (122)/(88)       General:  unremarkable, age appropriate, casual attire, good eye contact, good rapport       Musculoskeletal  Muscle Strength/Tone:  no flaccidity, no tremor    Gait & Station:  normal      Psychiatric                       Speech:  normal tone, normal rate, rhythm, and volume   Mood & Affect:   Depressed, anxious         Thought Process:   Goal directed; Linear    Associations:   intact   Thought Content:   No SI/HI, delusions, or paranoia, no AV/VH   Insight & Judgement:   Good, adequate to circumstances   Orientation:   grossly intact; alert and oriented x 4    Memory:  intact for content of interview    Language:  grossly intact, can repeat    Attention Span  : Grossly intact for content of interview   Fund of Knowledge:   intact and appropriate to age and level of education        Assessment and Diagnosis   Status/Progress: increasing distress     Impression: Pt reports attention/concentration concerns consistent with ADHD, combined type. Pt explained that her symptoms were present before the age of 13 and are cause impairment in more than one setting. Pt also struggles with recurrent depression and anxiety. Pt should continue in psychiatric medication management.     Diagnosis(es):   1) Major Depressive Disorder, recurrent, mild  2) ADHD, combined type    Intervention/Counseling/Treatment Plan   Medication Management:      1. Stop Pristiq    2. Start Trintellix 10mg once daily     3. Continue Adderall 10mg Q AM and Xanax 0.25mg Q D PRN     4. Start IMTT with Dr. Floyd     5. Call to report any worsening of symptoms or problems with the medication. Pt instructed to go to ER with thoughts of harming self, others    Psychotherapy:   Target symptoms: attention/concentration, depression  Why chosen therapy is appropriate versus  another modality: CBT used; relevant to diagnosis, patient responds to this modality  Outcome monitoring methods: self-report, observation  Therapeutic intervention type: Cognitive Behavioral Therapy  Topics discussed/themes: building skills sets for symptom management, symptom recognition, nutrition, exercise  The patient's response to the intervention is fair  Patient's response to treatment is: good.   The patient's progress toward treatment goals: increasing distress  16 minutes spent with pt in psychotherapy and 5 minutes in medication management     Return to clinic: 6 weeks    -Cognitive-Behavioral/Supportive therapy and psychoeducation provided  -R/B/SE's of medications discussed with the pt who expresses understanding and chooses to take medications as prescribed.   -Pt instructed to call clinic, 911 or go to nearest emergency room if sxs worsen or pt is in   crisis. The pt expresses understanding.    Teo Infante, PhD, MP     Antidepressant/Antianxiety Medication Initiation:  Patient informed of risks, benefits, and potential side effects of medication and accepts informed consent.  Common side effects include nausea, fatigue, headache, insomnia., Specifically discussed the possibility of new or worsening suicidal thoughts/depression.  Patient instructed to stop the medication immediately and seek urgent treatment if this occurs. Patient instructed not to abruptly discontinue medication without physician guidance except in cases of sudden onset or worsening of SI.

## 2024-11-25 ENCOUNTER — TELEPHONE (OUTPATIENT)
Dept: PSYCHIATRY | Facility: CLINIC | Age: 59
End: 2024-11-25
Payer: COMMERCIAL

## 2024-11-25 NOTE — TELEPHONE ENCOUNTER
"Patient called requesting to up her dose on the Trintellix.   States, " he wanted to start me off at a higher dose, but I wasn't too sure about it." Now I fell like I should've started on a higher dose."    Patient has a follow up scheduled for 12/19/2024.  Please advice.  Zahra"
PA/NP...

## 2024-11-27 RX ORDER — VORTIOXETINE 20 MG/1
20 TABLET, FILM COATED ORAL DAILY
Qty: 30 TABLET | Refills: 2 | Status: SHIPPED | OUTPATIENT
Start: 2024-11-27

## 2024-12-19 ENCOUNTER — OFFICE VISIT (OUTPATIENT)
Dept: PSYCHIATRY | Facility: CLINIC | Age: 59
End: 2024-12-19
Payer: COMMERCIAL

## 2024-12-19 VITALS
BODY MASS INDEX: 30.42 KG/M2 | HEIGHT: 69 IN | SYSTOLIC BLOOD PRESSURE: 108 MMHG | HEART RATE: 69 BPM | WEIGHT: 205.38 LBS | DIASTOLIC BLOOD PRESSURE: 77 MMHG

## 2024-12-19 DIAGNOSIS — F33.0 MILD EPISODE OF RECURRENT MAJOR DEPRESSIVE DISORDER: ICD-10-CM

## 2024-12-19 DIAGNOSIS — F90.0 ADHD (ATTENTION DEFICIT HYPERACTIVITY DISORDER), INATTENTIVE TYPE: Primary | ICD-10-CM

## 2024-12-19 PROCEDURE — 99214 OFFICE O/P EST MOD 30 MIN: CPT | Mod: S$GLB,,, | Performed by: PSYCHOLOGIST

## 2024-12-19 PROCEDURE — 3074F SYST BP LT 130 MM HG: CPT | Mod: CPTII,S$GLB,, | Performed by: PSYCHOLOGIST

## 2024-12-19 PROCEDURE — 90833 PSYTX W PT W E/M 30 MIN: CPT | Mod: S$GLB,,, | Performed by: PSYCHOLOGIST

## 2024-12-19 PROCEDURE — 99999 PR PBB SHADOW E&M-EST. PATIENT-LVL III: CPT | Mod: PBBFAC,,, | Performed by: PSYCHOLOGIST

## 2024-12-19 PROCEDURE — 3078F DIAST BP <80 MM HG: CPT | Mod: CPTII,S$GLB,, | Performed by: PSYCHOLOGIST

## 2024-12-19 PROCEDURE — 3008F BODY MASS INDEX DOCD: CPT | Mod: CPTII,S$GLB,, | Performed by: PSYCHOLOGIST

## 2024-12-19 PROCEDURE — 1159F MED LIST DOCD IN RCRD: CPT | Mod: CPTII,S$GLB,, | Performed by: PSYCHOLOGIST

## 2024-12-19 RX ORDER — DEXTROAMPHETAMINE SACCHARATE, AMPHETAMINE ASPARTATE, DEXTROAMPHETAMINE SULFATE AND AMPHETAMINE SULFATE 2.5; 2.5; 2.5; 2.5 MG/1; MG/1; MG/1; MG/1
10 TABLET ORAL DAILY
Qty: 30 TABLET | Refills: 0 | Status: SHIPPED | OUTPATIENT
Start: 2024-12-19

## 2024-12-19 RX ORDER — VORTIOXETINE 20 MG/1
20 TABLET, FILM COATED ORAL DAILY
Qty: 90 TABLET | Refills: 0 | Status: SHIPPED | OUTPATIENT
Start: 2024-12-19

## 2024-12-19 NOTE — PROGRESS NOTES
Outpatient Psychiatry Follow-Up Visit    Clinical Status of Patient: Outpatient (Ambulatory)  12/19/2024     Chief Complaint: 59 year old female presenting today for a follow-up.       Interval History and Content of Current Session:  Interim Events/Subjective Report/Content of Current Session:  follow-up appointment.    Pt is a 59 year old female with past psychiatric hx of ADHD and depression who presents for follow-up treatment. Pt feels somewhat better on Trintellix 20mg but still is having crying bouts in school that she feesl are inappropriate. Pt understands that it is the stress of the trauma she has been going through. We discussed Wellbutrin. Pt will try Xanax for situational anxiety first. Pt denied any other major changes or new stressors.    Past Psychiatric hx: Vyvanse, Prozac (sexual SE); Lexapro (sexual SE); Pristiq 50mg Q D (sexual SE)    Past Medical hx:   Past Medical History:   Diagnosis Date    Adult hypothyroidism 04/05/2007    Anxiety and depression         Interim hx:  Medication changes last visit:  stopped Pristiq and started Trintellix and increased to 20mg Q D  Anxiety: moderate - improved  Depression: moderate - improved     Denies suicidal/homicidal ideations.  Denies hopelessness/worthlessness.    Denies auditory/visual hallucinations      Alcohol: minimal, infrequent  Drug: pt denied  Caffeine: minimal use  Tobacco: pt denied      Review of Systems   PSYCHIATRIC: Pertinent items are noted in the narrative.        CONSTITUTIONAL: weight stable    Past Medical, Family and Social History: The patient's past medical, family and social history have been reviewed and updated as appropriate within the electronic medical record. See encounter notes.     Current Psychiatric Medication:  Trintellix 20mg once daily, Adderall 10mg Q AM and Xanax 0.25mg Q D PRN      Compliance: yes      Side effects: pt denied     Risk Parameters:  Patient reports no suicidal ideation  Patient reports no homicidal  ideation  Patient reports no self-injurious behavior  Patient reports no violent behavior     Exam (detailed: at least 9 elements; comprehensive: all 15 elements)   Constitutional  Vitals:  Most recent vital signs, dated less than 90 days prior to this appointment, were reviewed. Pulse:  [69]   BP: (108)/(77)       General:  unremarkable, age appropriate, casual attire, good eye contact, good rapport       Musculoskeletal  Muscle Strength/Tone:  no flaccidity, no tremor    Gait & Station:  normal      Psychiatric                       Speech:  normal tone, normal rate, rhythm, and volume   Mood & Affect:   Depressed, anxious         Thought Process:   Goal directed; Linear    Associations:   intact   Thought Content:   No SI/HI, delusions, or paranoia, no AV/VH   Insight & Judgement:   Good, adequate to circumstances   Orientation:   grossly intact; alert and oriented x 4    Memory:  intact for content of interview    Language:  grossly intact, can repeat    Attention Span  : Grossly intact for content of interview   Fund of Knowledge:   intact and appropriate to age and level of education        Assessment and Diagnosis   Status/Progress: improving     Impression: Pt reports attention/concentration concerns consistent with ADHD, combined type. Pt explained that her symptoms were present before the age of 13 and are cause impairment in more than one setting. Pt also struggles with recurrent depression and anxiety. Pt should continue in psychiatric medication management.     Diagnosis(es):   1) Major Depressive Disorder, recurrent, mild  2) ADHD, combined type    Intervention/Counseling/Treatment Plan   Medication Management:      1. Continue Trintellix 20mg once daily, Adderall 10mg Q AM and Xanax 0.25mg Q D PRN    2. Continue IMTT with Dr. Floyd     3. Call to report any worsening of symptoms or problems with the medication. Pt instructed to go to ER with thoughts of harming self, others    Psychotherapy:   Target  symptoms: attention/concentration, depression  Why chosen therapy is appropriate versus another modality: CBT used; relevant to diagnosis, patient responds to this modality  Outcome monitoring methods: self-report, observation  Therapeutic intervention type: Cognitive Behavioral Therapy  Topics discussed/themes: building skills sets for symptom management, symptom recognition, nutrition, exercise  The patient's response to the intervention is good  Patient's response to treatment is: good.   The patient's progress toward treatment goals: improving  16 minutes spent with pt in psychotherapy and 5 minutes in medication management     Return to clinic: 3 months or earlier PRN    -Cognitive-Behavioral/Supportive therapy and psychoeducation provided  -R/B/SE's of medications discussed with the pt who expresses understanding and chooses to take medications as prescribed.   -Pt instructed to call clinic, 911 or go to nearest emergency room if sxs worsen or pt is in   crisis. The pt expresses understanding.    Teo Infante, PhD, MP

## 2025-02-03 RX ORDER — VORTIOXETINE 20 MG/1
20 TABLET, FILM COATED ORAL DAILY
Qty: 90 TABLET | Refills: 0 | Status: SHIPPED | OUTPATIENT
Start: 2025-02-03 | End: 2025-02-17 | Stop reason: SDUPTHER

## 2025-02-18 RX ORDER — VORTIOXETINE 20 MG/1
20 TABLET, FILM COATED ORAL DAILY
Qty: 90 TABLET | Refills: 0 | Status: SHIPPED | OUTPATIENT
Start: 2025-02-18

## 2025-03-05 ENCOUNTER — OFFICE VISIT (OUTPATIENT)
Dept: PSYCHIATRY | Facility: CLINIC | Age: 60
End: 2025-03-05
Payer: COMMERCIAL

## 2025-03-05 VITALS
HEIGHT: 69 IN | BODY MASS INDEX: 30.74 KG/M2 | WEIGHT: 207.56 LBS | HEART RATE: 79 BPM | SYSTOLIC BLOOD PRESSURE: 116 MMHG | DIASTOLIC BLOOD PRESSURE: 72 MMHG

## 2025-03-05 DIAGNOSIS — F90.0 ADHD (ATTENTION DEFICIT HYPERACTIVITY DISORDER), INATTENTIVE TYPE: Primary | ICD-10-CM

## 2025-03-05 DIAGNOSIS — F33.0 MILD EPISODE OF RECURRENT MAJOR DEPRESSIVE DISORDER: ICD-10-CM

## 2025-03-05 PROCEDURE — 1159F MED LIST DOCD IN RCRD: CPT | Mod: CPTII,S$GLB,, | Performed by: PSYCHOLOGIST

## 2025-03-05 PROCEDURE — 3078F DIAST BP <80 MM HG: CPT | Mod: CPTII,S$GLB,, | Performed by: PSYCHOLOGIST

## 2025-03-05 PROCEDURE — 99999 PR PBB SHADOW E&M-EST. PATIENT-LVL III: CPT | Mod: PBBFAC,,, | Performed by: PSYCHOLOGIST

## 2025-03-05 PROCEDURE — 3074F SYST BP LT 130 MM HG: CPT | Mod: CPTII,S$GLB,, | Performed by: PSYCHOLOGIST

## 2025-03-05 PROCEDURE — 90833 PSYTX W PT W E/M 30 MIN: CPT | Mod: S$GLB,,, | Performed by: PSYCHOLOGIST

## 2025-03-05 PROCEDURE — 99214 OFFICE O/P EST MOD 30 MIN: CPT | Mod: S$GLB,,, | Performed by: PSYCHOLOGIST

## 2025-03-05 PROCEDURE — G2211 COMPLEX E/M VISIT ADD ON: HCPCS | Mod: S$GLB,,, | Performed by: PSYCHOLOGIST

## 2025-03-05 PROCEDURE — 3008F BODY MASS INDEX DOCD: CPT | Mod: CPTII,S$GLB,, | Performed by: PSYCHOLOGIST

## 2025-03-05 RX ORDER — VORTIOXETINE 20 MG/1
20 TABLET, FILM COATED ORAL DAILY
Qty: 90 TABLET | Refills: 1 | Status: SHIPPED | OUTPATIENT
Start: 2025-03-05

## 2025-03-05 NOTE — PROGRESS NOTES
Outpatient Psychiatry Follow-Up Visit    Clinical Status of Patient: Outpatient (Ambulatory)  03/05/2025     Chief Complaint: 59 year old female presenting today for a follow-up.       Interval History and Content of Current Session:  Interim Events/Subjective Report/Content of Current Session:  follow-up appointment.    Pt is a 59 year old female with past psychiatric hx of ADHD and depression who presents for follow-up treatment. Pt is coping well enough. Pt discussed being an empath and how that affects her at school. Pt has taken a Xanax a few times in the last few months with good effect. Pt is managing well enough. Pt is taking Adderall very infrequently. Pt denied any other major changes or new stressors.    Past Psychiatric hx: Vyvanse, Prozac (sexual SE); Lexapro (sexual SE); Pristiq 50mg Q D (sexual SE)    Past Medical hx:   Past Medical History:   Diagnosis Date    Adult hypothyroidism 04/05/2007    Anxiety and depression         Interim hx:  Medication changes last visit:  none  Anxiety: moderate - unchanged  Depression: moderate - unchanged     Denies suicidal/homicidal ideations.  Denies hopelessness/worthlessness.    Denies auditory/visual hallucinations      Alcohol: minimal, infrequent  Drug: pt denied  Caffeine: minimal use  Tobacco: pt denied      Review of Systems   PSYCHIATRIC: Pertinent items are noted in the narrative.        CONSTITUTIONAL: weight stable    Past Medical, Family and Social History: The patient's past medical, family and social history have been reviewed and updated as appropriate within the electronic medical record. See encounter notes.     Current Psychiatric Medication:  Trintellix 20mg once daily, Adderall 10mg Q AM and Xanax 0.25mg Q D PRN      Compliance: yes      Side effects: pt denied     Risk Parameters:  Patient reports no suicidal ideation  Patient reports no homicidal ideation  Patient reports no self-injurious behavior  Patient reports no violent behavior      Exam (detailed: at least 9 elements; comprehensive: all 15 elements)   Constitutional  Vitals:  Most recent vital signs, dated less than 90 days prior to this appointment, were reviewed. Pulse:  [79]   BP: (116)/(72)       General:  unremarkable, age appropriate, casual attire, good eye contact, good rapport       Musculoskeletal  Muscle Strength/Tone:  no flaccidity, no tremor    Gait & Station:  normal      Psychiatric                       Speech:  normal tone, normal rate, rhythm, and volume   Mood & Affect:   Depressed, anxious         Thought Process:   Goal directed; Linear    Associations:   intact   Thought Content:   No SI/HI, delusions, or paranoia, no AV/VH   Insight & Judgement:   Good, adequate to circumstances   Orientation:   grossly intact; alert and oriented x 4    Memory:  intact for content of interview    Language:  grossly intact, can repeat    Attention Span  : Grossly intact for content of interview   Fund of Knowledge:   intact and appropriate to age and level of education        Assessment and Diagnosis   Status/Progress: improving     Impression: Pt reports attention/concentration concerns consistent with ADHD, combined type. Pt explained that her symptoms were present before the age of 13 and are cause impairment in more than one setting. Pt also struggles with recurrent depression and anxiety. Pt should continue in psychiatric medication management.     Diagnosis(es):   1) Major Depressive Disorder, recurrent, mild  2) ADHD, combined type    Intervention/Counseling/Treatment Plan   Medication Management:      1. Continue Trintellix 20mg once daily, Adderall 10mg Q AM and Xanax 0.25mg Q D PRN    2. Continue IMTT with Dr. Floyd     3. Call to report any worsening of symptoms or problems with the medication. Pt instructed to go to ER with thoughts of harming self, others    Psychotherapy:   Target symptoms: attention/concentration, depression  Why chosen therapy is appropriate versus  another modality: CBT used; relevant to diagnosis, patient responds to this modality  Outcome monitoring methods: self-report, observation  Therapeutic intervention type: Cognitive Behavioral Therapy  Topics discussed/themes: building skills sets for symptom management, symptom recognition, nutrition, exercise  The patient's response to the intervention is good  Patient's response to treatment is: good.   The patient's progress toward treatment goals: improving  16 minutes spent with pt in psychotherapy and 5 minutes in medication management     Return to clinic: 6 months or earlier PRN    -Cognitive-Behavioral/Supportive therapy and psychoeducation provided  -R/B/SE's of medications discussed with the pt who expresses understanding and chooses to take medications as prescribed.   -Pt instructed to call clinic, 911 or go to nearest emergency room if sxs worsen or pt is in   crisis. The pt expresses understanding.    Teo Infante, PhD, MP     Visit today included increased complexity associated with the care of the episodic problem associated with mental health. Addressed and managed the longitudinal care of the patient due to the serious and/or complex mental health diagnosis.

## 2025-07-09 RX ORDER — VORTIOXETINE 20 MG/1
20 TABLET, FILM COATED ORAL DAILY
Qty: 90 TABLET | Refills: 1 | Status: SHIPPED | OUTPATIENT
Start: 2025-07-09

## 2025-08-13 RX ORDER — VORTIOXETINE 20 MG/1
20 TABLET, FILM COATED ORAL DAILY
Qty: 90 TABLET | Refills: 1 | Status: SHIPPED | OUTPATIENT
Start: 2025-08-13

## (undated) DEVICE — SPONGE PATTY SURGICAL .5X3IN

## (undated) DEVICE — UNDERGLOVES BIOGEL PI SZ 6 LF

## (undated) DEVICE — SUT PROLENE 3-0 PS-2 BL 18IN

## (undated) DEVICE — STRIP MEDI WND CLSR 1/2X4IN

## (undated) DEVICE — MARKER SKIN RULER STERILE

## (undated) DEVICE — LABEL FOR UTILITY MARKER

## (undated) DEVICE — SPLINT NASAL AIRWAY SEPTAL SIL

## (undated) DEVICE — HANDLE SURG LIGHT NONRIGID

## (undated) DEVICE — STRAP OR TABLE 5IN X 72IN

## (undated) DEVICE — TOWEL OR DISP STRL BLUE 4/PK

## (undated) DEVICE — TUBING SUC UNIV W/CONN 12FT

## (undated) DEVICE — ADHESIVE MASTISOL VIAL 48/BX

## (undated) DEVICE — CONTAINER SPECIMEN OR STER 4OZ

## (undated) DEVICE — KIT SAHARA DRAPE DRAW/LIFT

## (undated) DEVICE — CUP MEDICINE STERILE 2OZ

## (undated) DEVICE — NEPTUNE 4 PORT MANIFOLD

## (undated) DEVICE — SPONGE GAUZE 16PLY 4X4